# Patient Record
(demographics unavailable — no encounter records)

---

## 2025-05-09 NOTE — PLAN
[FreeTextEntry1] : Plan: Pt meets eligibility criteria for lung cancer screening.    -Low dose CT chest for lung cancer screening. Dr. Virginia Smith ordered the low dose CT.       -Follow up with  his referring provider after his LDCT results have been reviewed by the multidisciplinary clinical team, if needed.    -Encouraged smoking cessation.    -Patient declines referral to [facility] and [facility]    Should I screen? tool utilized. 6 Year risk of lung cancer is  2.6 %.   Patient wishes to proceed with screening.   Engaged in discussion regarding risks of screening during Covid-19 pandemic and precautions that are being used  to reduce exposure.   Engaged in shared decision making with Mr. MAO . Answered all questions. He verbalized understanding and agreement. He knows to call back with and questions or concerns.

## 2025-05-09 NOTE — ASSESSMENT
[Declined] : Patient has declined cessation intervention [de-identified] : Acknowledged how difficult it was to quit smoking. Advised that quitting smoking is the most important thing a person can do for their health. He agrees to call writer at any point in the future he wishes to discuss smoking cessation programs with [facility].

## 2025-05-09 NOTE — HISTORY OF PRESENT ILLNESS
[Current] : Current [TextBox_13] : Referred by Dr. Virginia Smith [facility]  BRIAN MAO had telephonic visit for a review of eligibility and discussion of the Low dose CT lung cancer screening program. A telephonic visit occurred due to the patient not having access, or declines to use, a smart phone or a computer for an audio/visual visit. The following was reviewed to determine if patient meets eligibility criteria. -Age 68 year Smoking Status: -Current smoker Started smoking at 20    years old. Smoked about 1 PPD for 40 years, then smoked 2-3 cigarettes for 8-9 years -Number of pack years smokin  Mr. MAO denies any signs or symptoms of lung cancer including new cough, changing cough, hemoptysis, and unintentional weight loss.   Mr. MAO tested positive for TB and was treated about 40 years ago when emigrated to [place] from [place]. Is HIV positive. He denies HX of heart disease, connective tissue disease, and any personal history of cancer. Reports no lung cancer in a 1st degree relative. Reports no lung cancer in a 2nd degree relative. Denies any history of occupational exposures.  [PacksperYear] : 41